# Patient Record
Sex: FEMALE | Race: WHITE | Employment: STUDENT | URBAN - METROPOLITAN AREA
[De-identification: names, ages, dates, MRNs, and addresses within clinical notes are randomized per-mention and may not be internally consistent; named-entity substitution may affect disease eponyms.]

---
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
## 2024-08-30 ENCOUNTER — OFFICE VISIT (OUTPATIENT)
Facility: CLINIC | Age: 19
End: 2024-08-30

## 2024-08-30 VITALS
HEIGHT: 63 IN | WEIGHT: 122 LBS | SYSTOLIC BLOOD PRESSURE: 102 MMHG | DIASTOLIC BLOOD PRESSURE: 60 MMHG | BODY MASS INDEX: 21.62 KG/M2

## 2024-08-30 DIAGNOSIS — Z11.3 SCREENING FOR STD (SEXUALLY TRANSMITTED DISEASE): ICD-10-CM

## 2024-08-30 DIAGNOSIS — Z30.41 ENCOUNTER FOR SURVEILLANCE OF CONTRACEPTIVE PILLS: ICD-10-CM

## 2024-08-30 DIAGNOSIS — Z01.419 ENCOUNTER FOR WELL WOMAN EXAM WITH ROUTINE GYNECOLOGICAL EXAM: Primary | ICD-10-CM

## 2024-08-30 PROCEDURE — 87591 N.GONORRHOEAE DNA AMP PROB: CPT | Performed by: OBSTETRICS & GYNECOLOGY

## 2024-08-30 PROCEDURE — 87491 CHLMYD TRACH DNA AMP PROBE: CPT | Performed by: OBSTETRICS & GYNECOLOGY

## 2024-08-30 RX ORDER — ACETIC ACID 20.65 MG/ML
SOLUTION AURICULAR (OTIC)
COMMUNITY
Start: 2024-08-19

## 2024-08-30 RX ORDER — DROSPIRENONE AND ETHINYL ESTRADIOL 0.02-3(28)
1 KIT ORAL DAILY
Qty: 84 TABLET | Refills: 3 | Status: SHIPPED | OUTPATIENT
Start: 2024-08-30

## 2024-08-30 NOTE — PROGRESS NOTES
GYN H&P     Genetic questionnaire reviewed with the patient and she will be referred for genetic counseling if the questionnaire had any positive results.    The [facility] intake form was also reviewed regarding contraception, menstrual periods, urinary health, and vaginal / sexual health    2024  8:41 AM    Chief Complaint   Patient presents with    Annual       HPI: Maegan is a 19 year old  Patient's last menstrual period was 2024 (within days).  (contraception: OCP) here for her annual gyn exam.     She has no complaints.   Menses are regular every month but don't always come the week she is on the inert pills.   Denies any pelvic or breast complaints.   Satisfied with current contraception.     Previous encounters and chart reviewed.   2023  2:56 PM     Patient presents with:  Physical: No concerns.        HPI: Maegan is a 18 year old  Patient's last menstrual period was 2023 (exact date).  (contraception: OCP) here for her annual gyn exam.      Here for WWE.     She has no complaints. Menses are regular. Denies any pelvic or breast complaints.  Sexually active, one partner, safe relationship. Satisfied with current contraception. Denies ACHES.     OB History    Para Term  AB Living   0 0 0 0 0 0   SAB IAB Ectopic Multiple Live Births   0 0 0 0 0       GYN hx:   Menarche: 13  Period Cycle (Days): 28  Period Duration (Days): 3-7  Period Flow: first few days heavy then tapers down  Use of Birth Control (if yes, specify type): OCP  Date When Birth Control Last Used: daily  Follow Up Recommendation: at 21 yo      Past Medical History:    Pneumonia due to organism    Scoliosis     Past Surgical History:   Procedure Laterality Date    Colonoscopy N/A 2021    Procedure: COLONOSCOPY;  Surgeon: Duke Monaco MD;  Location: [facility] ENDOSCOPY    Carnegie teeth removed  2023     Allergies   Allergen Reactions    Sulfamethoxazole W/Trimethoprim HIVES  and OTHER (SEE COMMENTS)     Strong family hx of allergy, presents with urticaria  Strong family hx of allergy, presents with urticaria  Strong family hx of allergy, presents with urticaria  Strong family hx of allergy, presents with urticaria      Cat Hair Extract OTHER (SEE COMMENTS)     Per testing    Dexmethylphenidate UNKNOWN    Milk UNKNOWN     Per testing    Penicillins UNKNOWN    Zithromax [Azithromycin] HIVES     Current Outpatient Medications on File Prior to Visit   Medication Sig Dispense Refill    acetic acid 2 % Otic Solution INSTILL 4 DROPS INTO EARS 3 TIMES A DAY FOR 7 TO 10 DAYS      hydrOXYzine 25 MG Oral Tab Take 1 tablet (25 mg total) by mouth daily.      Amphetamine-Dextroamphet ER 20 MG Oral Capsule SR 24 Hr       Amphetamine-Dextroamphet ER 15 MG Oral Capsule SR 24 Hr       [DISCONTINUED] VESTURA 3-0.02 MG Oral Tab Take 1 tablet by mouth daily. Please take at the same time each day 84 tablet 3    sertraline 25 MG Oral Tab Take 25 mg by mouth daily. (Patient not taking: Reported on 7/21/2023)      FLUoxetine HCl 10 MG Oral Cap  (Patient not taking: Reported on 7/30/2021)      Benzoyl Peroxide-Erythromycin 5-3 % External Gel APPLY TO FACE EVERY DAY AT BEDTIME (Patient not taking: Reported on 7/30/2021) 46.6 g 3    Sulfacetamide Sodium, Acne, (KLARON) 10 % External Lotion Apply to face 1-2 times daily as tolerated (Patient not taking: Reported on 7/30/2021) 118 mL 3    ferrous sulfate 325 (65 FE) MG Oral Tab EC Take 325 mg by mouth daily with breakfast. (Patient not taking: Reported on 7/30/2021)      Vitamin D3 50 MCG (2000 UT) Oral Cap Take 2,000 Units by mouth daily. (Patient not taking: Reported on 7/30/2021)      Adapalene 0.1 % External Gel Apply a pea size amount to face QOD to nightly until tolerated, may mix with moisturizer (Patient not taking: Reported on 7/30/2021) 45 g 2    Benzoyl Peroxide (PANOXYL-4 CREAMY WASH) 4 % External Liquid Wash face, chest, and back daily (Patient not  taking: Reported on 7/30/2021) 204 mL 2     No current facility-administered medications on file prior to visit.     Family History   Problem Relation Age of Onset    Other (Other) Sister         scoliosis/Migraine    Other (Other) Father         Migraines    No Known Problems Mother     Cancer Other         No family hx Breast/Ovarian/Colon Ca    No Known Problems Sister     Diabetes Neg      Social History     Socioeconomic History    Marital status: Single     Spouse name: Not on file    Number of children: Not on file    Years of education: Not on file    Highest education level: Not on file   Occupational History    Not on file   Tobacco Use    Smoking status: Never     Passive exposure: Past    Smokeless tobacco: Never   Vaping Use    Vaping status: Former   Substance and Sexual Activity    Alcohol use: Not Currently     Comment: social with parents    Drug use: No    Sexual activity: Yes     Birth control/protection: OCP   Other Topics Concern    Not on file   Social History Narrative    Not on file     Social Determinants of Health     Financial Resource Strain: Not on file   Food Insecurity: Not on file   Transportation Needs: Not on file   Physical Activity: Not on file   Stress: Not on file   Social Connections: Unknown (3/14/2021)    Received from [facility], [facility]    Social Connections     Conversations with friends/family/neighbors per week: Not on file   Housing Stability: Low Risk  (7/9/2021)    Received from [facility], [facility]    Housing Stability     Mortgage Payment Concerns?: Not on file     Number of Places Lived in the Last Year: Not on file     Unstable Housing?: Not on file       ROS:     Review of Systems:  General: denies fevers, chills, fatigue and malaise.   Eyes: no visual changes, denies headaches  ENT: no complaints, denies earaches, runny nose, epistaxis, throat pain or sore throat  Respiratory:  denies SOB, dyspnea, cough or wheezing  Cardiovascular: denies chest pain, palpitations, exercise intolerance   GI: denies abdominal pain, diarrhea, constipation  : no complaints, denies dysuria, increased urinary frequency. Menses regular, no dysmenorrhea, no menorrhagia, no dyspareunia   Hematological/lymphatic: denies history of excessive bleeding or bruising, denies dizziness, lightheadedness.   Breast: denies rashes, skin changes, pain, lumps or discharge   Psychiatric: denies depression, changes in sleep patterns, anxiety  Endocrine: denies hot or cold intolerance, mood changes   Neurological: denies changes in sight, smell, hearing or taste. Denies seizures or tremors  Immunological: denies allergies, denies anaphylaxis, or swollen lymph nodes  Musculoskeletal: denies joint pain, morning stiffness, decreased range of motion         O /60   Ht 63\"   Wt 122 lb (55.3 kg)   LMP 07/30/2024 (Within Days)   BMI 21.61 kg/m²         Wt Readings from Last 6 Encounters:   08/30/24 122 lb (55.3 kg)   07/21/23 124 lb (56.2 kg) (43%, Z= -0.18)*   08/12/22 119 lb (54 kg) (37%, Z= -0.34)*   10/19/21 129 lb (58.5 kg) (61%, Z= 0.27)*   07/30/21 122 lb 8 oz (55.6 kg) (49%, Z= -0.02)*   06/14/21 120 lb (54.4 kg) (45%, Z= -0.14)*     * Growth percentiles are based on CDC (Girls, 2-20 Years) data.     Exam:   GENERAL: well developed, well nourished, in no apparent distress, oriented.  SKIN: no rashes, no suspicious lesions  HEENT: normal  NECK: supple; no thyromegaly, no adenopathy  LUNGS: clear to auscultation  CARDIOVASCULAR: normal S1, S2, RRR  BREASTS: encouraged SBE,   ABDOMEN: no scars,  soft, non distended; non tender, no masses  PELVIC: External Genitalia: Normal appearing, no lesions.    Vagina: normal pink mucosa, no lesions, normal clear discharge.    Bladder well supported.  No  anterior or posterior hernias    Cervix: nulliparous, no lesions , No CMT     Uterus: AVAF, mobile, non tender, normal size     Adnexa: non tender, no masses, normal size    Rectal: deferred  EXTREMITIES:  non tender without edema        A/P: Patient is 19 year old female with no complaints. Here for well woman exam.            Patient counseled on:    Diet/exercise.      Self Breast Exams     Safe sex practices / and living environment     Vaccines:  Annual Flu, Tdap +/- Gardasil  recommended (up to 45 yrs).      Pneumococcal at 65 yrs old, Shingles at 60 yrs old          Pap: neg/neg - Year:  na  GC/Chlamydia:  done  Mammogram:  na   Dexa:  na  Colonoscopy / Cologuard:   na  Lipid / Cholesterol:           Meds This Visit:    Requested Prescriptions     Signed Prescriptions Disp Refills    VESTURA 3-0.02 MG Oral Tab 84 tablet 3     Sig: Take 1 tablet by mouth daily. Please take at the same time each day       1. Encounter for well woman exam with routine gynecological exam    2. Screening for STD (sexually transmitted disease)  - Chlamydia/Gc Amplification; Future    3. Encounter for surveillance of contraceptive pills  - VESTURA 3-0.02 MG Oral Tab; Take 1 tablet by mouth daily. Please take at the same time each day  Dispense: 84 tablet; Refill: 3      Return in about 1 year (around 8/30/2025) for Well Woman Exam.  Offered a 30 micrograms E  pill for better cycle control.  Declined.    Francisco Lee MD   8/30/2024  8:41 AM       This note was created by [employer] voice recognition. Errors in content may be related to improper recognition by the system; efforts to review and correct have been done but errors may still exist. Please contact me with any questions.

## 2024-09-03 LAB
C TRACH DNA SPEC QL NAA+PROBE: NEGATIVE
N GONORRHOEA DNA SPEC QL NAA+PROBE: NEGATIVE

## 2024-11-26 ENCOUNTER — MED REC SCAN ONLY (OUTPATIENT)
Facility: CLINIC | Age: 19
End: 2024-11-26

## (undated) DEVICE — FORCEP RADIAL JAW 4

## (undated) DEVICE — LINE MNTR ADLT SET O2 INTMD

## (undated) DEVICE — 35 ML SYRINGE REGULAR TIP: Brand: MONOJECT

## (undated) DEVICE — Device: Brand: CUSTOM PROCEDURE KIT

## (undated) DEVICE — MEDI-VAC NON-CONDUCTIVE SUCTION TUBING 6MM X 1.8M (6FT.) L: Brand: CARDINAL HEALTH

## (undated) DEVICE — Device: Brand: DEFENDO AIR/WATER/SUCTION AND BIOPSY VALVE

## (undated) NOTE — MR AVS SNAPSHOT
After Visit Summary   11/10/2017    Tierney Michelle    MRN: [id number]           Visit Information     Date & Time  11/10/2017  8:00 PM Provider  [address] Dept[address]  Phone  [phone]      Allergies as of using [employer]      e-VISITS  Communicate with a [facility] physician   online. The physician will respond and provide a treatment plan within a few hours.            Treatment for mild   illness or injury that does   not require immediate attention   VIDEO VISITS

## (undated) NOTE — ED AVS SNAPSHOT
Johnathan Hughes   MRN: [id number]    Department:  [facility] Emergency Department   Date of Visit:  2/4/2018           Disclosure     Insurance plans vary and the physician(s) referred by the ER may not be covered by your plan.  Please contact your tell this physician (or your personal doctor if your instructions are to return to your personal doctor) about any new or lasting problems. The primary care or specialist physician will see patients referred from the [facility] Emergency Department.  Dori Kim